# Patient Record
Sex: MALE | Race: WHITE | NOT HISPANIC OR LATINO | Employment: UNEMPLOYED | URBAN - METROPOLITAN AREA
[De-identification: names, ages, dates, MRNs, and addresses within clinical notes are randomized per-mention and may not be internally consistent; named-entity substitution may affect disease eponyms.]

---

## 2021-06-04 ENCOUNTER — HOSPITAL ENCOUNTER (EMERGENCY)
Facility: HOSPITAL | Age: 46
Discharge: HOME | End: 2021-06-04
Admitting: EMERGENCY MEDICINE

## 2021-06-04 VITALS
SYSTOLIC BLOOD PRESSURE: 126 MMHG | OXYGEN SATURATION: 99 % | BODY MASS INDEX: 25.5 KG/M2 | TEMPERATURE: 98.2 F | HEART RATE: 74 BPM | WEIGHT: 209.44 LBS | DIASTOLIC BLOOD PRESSURE: 96 MMHG | RESPIRATION RATE: 16 BRPM | HEIGHT: 76 IN

## 2021-06-04 DIAGNOSIS — M25.512 LEFT SHOULDER PAIN, UNSPECIFIED CHRONICITY: Primary | ICD-10-CM

## 2021-06-04 PROCEDURE — 25010000002 KETOROLAC TROMETHAMINE PER 15 MG: Performed by: NURSE PRACTITIONER

## 2021-06-04 PROCEDURE — 96372 THER/PROPH/DIAG INJ SC/IM: CPT

## 2021-06-04 PROCEDURE — 99283 EMERGENCY DEPT VISIT LOW MDM: CPT

## 2021-06-04 RX ORDER — NAPROXEN 500 MG/1
500 TABLET ORAL 2 TIMES DAILY WITH MEALS
Qty: 11 TABLET | Refills: 0 | Status: SHIPPED | OUTPATIENT
Start: 2021-06-04

## 2021-06-04 RX ORDER — HYDROCODONE BITARTRATE AND ACETAMINOPHEN 5; 325 MG/1; MG/1
1 TABLET ORAL ONCE AS NEEDED
Status: COMPLETED | OUTPATIENT
Start: 2021-06-04 | End: 2021-06-04

## 2021-06-04 RX ORDER — KETOROLAC TROMETHAMINE 30 MG/ML
30 INJECTION, SOLUTION INTRAMUSCULAR; INTRAVENOUS ONCE
Status: COMPLETED | OUTPATIENT
Start: 2021-06-04 | End: 2021-06-04

## 2021-06-04 RX ADMIN — HYDROCODONE BITARTRATE AND ACETAMINOPHEN 1 TABLET: 5; 325 TABLET ORAL at 16:12

## 2021-06-04 RX ADMIN — KETOROLAC TROMETHAMINE 30 MG: 30 INJECTION, SOLUTION INTRAMUSCULAR at 16:12

## 2021-06-04 NOTE — DISCHARGE INSTRUCTIONS
Take naproxen as prescribed.  Take with food to avoid GI upset and follow-up with Ortho, Dr. Rowland.  Call for an appointment.

## 2021-06-04 NOTE — ED PROVIDER NOTES
Subjective   History: Patient is a 45-year-old male complains of left shoulder pain.  States last Sunday he fell down the stairs dislocating his left shoulder.  Initially did not go to the ER states he went to sleep woke up was able to reduce his own shoulder.  States he eventually went to [place] [facility] had an x-ray showing no abnormality and was sent home.  States he was not given Ortho for follow-up.  Reports he is unable to go to work as he still has pain to the left shoulder although it has improved some.  No numbness tingling or weakness to the extremity.      Onset: 1 week  Location: Left shoulder  Duration: Waxes wanes  Character: Throb  Aggravating/Alleviating factors: Worse when pushing arm down  Radiation nonradiating  Severity: Mild/moderate            Review of Systems   Constitutional: Negative for chills, fatigue and fever.   HENT: Negative for congestion, sore throat, tinnitus and trouble swallowing.    Eyes: Negative for photophobia, discharge and visual disturbance.   Respiratory: Negative for cough and shortness of breath.    Cardiovascular: Negative for chest pain.   Gastrointestinal: Negative for abdominal pain, diarrhea, nausea and vomiting.   Genitourinary: Negative for dysuria, frequency and urgency.   Musculoskeletal: Positive for arthralgias. Negative for back pain and myalgias.   Skin: Negative for rash.   Neurological: Negative for dizziness and headaches.   Psychiatric/Behavioral: Negative for confusion.       No past medical history on file.    Allergies   Allergen Reactions   • Penicillins Rash       No past surgical history on file.    No family history on file.    Social History     Socioeconomic History   • Marital status:      Spouse name: Not on file   • Number of children: Not on file   • Years of education: Not on file   • Highest education level: Not on file           Objective   Physical Exam  Vitals reviewed.   Constitutional:       General: He is not in acute distress.      "Appearance: He is normal weight. He is not toxic-appearing.   HENT:      Head: Normocephalic.   Cardiovascular:      Rate and Rhythm: Normal rate.      Pulses: Normal pulses.      Heart sounds: Normal heart sounds. No murmur heard.     Pulmonary:      Effort: Pulmonary effort is normal.   Musculoskeletal:         General: Tenderness present. No swelling or deformity.      Cervical back: Normal range of motion.      Comments: No erythema edema warmth or surface trauma left shoulder. No deformity.  Tenderness of palpation anterior lateral and posterior shoulder. Pain with active ROM approx 45 degrees with extension and abduction left shoulder and some crepitus noted.  No pain with passive ROM. Negative empty can test. Less than 2 sec cap refill, 2+ radial pulse   Skin:     General: Skin is warm and dry.      Findings: No rash.   Neurological:      Mental Status: He is alert and oriented to person, place, and time.   Psychiatric:         Mood and Affect: Mood normal.         Behavior: Behavior normal.         Thought Content: Thought content normal.         Judgment: Judgment normal.         Procedures           ED Course      /94 (BP Location: Right arm, Patient Position: Lying)   Pulse 73   Temp 98.2 °F (36.8 °C)   Resp 16   Ht 193 cm (76\")   Wt 95 kg (209 lb 7 oz)   SpO2 100%   BMI 25.49 kg/m²   Labs Reviewed - No data to display  Medications   HYDROcodone-acetaminophen (NORCO) 5-325 MG per tablet 1 tablet (1 tablet Oral Given 6/4/21 1612)   ketorolac (TORADOL) injection 30 mg (30 mg Intramuscular Given 6/4/21 1612)     No radiology results for the last day                                       MDM     Given norco and toradol in ER.  No imaging as there is no new injury and patient reports there is some improvement in the pain but he is unable to work due to the pain. Recent xray [place] [facility] patient states showed no abnormality.  Will give ortho follow up    Rx naproxen    Final diagnoses:   Left shoulder " pain, unspecified chronicity       ED Disposition  ED Disposition     ED Disposition Condition Comment    Discharge Stable           Garo Rowland MD  [address] [place] [address] [phone]               Medication List      New Prescriptions    naproxen 500 MG EC tablet  Commonly known as: EC NAPROSYN  Take 1 tablet by mouth 2 (Two) Times a Day With Meals.           Where to Get Your Medications      These medications were sent to [facility] [address] [place] [address] - [address] [facility] - [phone] [place] [phone]   [address] [facility] [place] [address]    Phone: [phone]   · naproxen 500 MG EC tablet          Sosa Elliott, APRN  06/04/21 7352

## 2021-06-04 NOTE — ED NOTES
Pt reports he dislocated his L shoulder and tried to pop it back in place himself. The next day his arm felt bad so he went to [place] where he was told he tore his muscle and could go back to work today. Pt reports he still cannot lift his arm even though he has been wearing a sling. Pt reports L shoulder pain is 8/10 and paralyzing.      Emily King, RN  06/04/21 8408

## 2022-08-26 ENCOUNTER — APPOINTMENT (OUTPATIENT)
Dept: GENERAL RADIOLOGY | Facility: HOSPITAL | Age: 47
End: 2022-08-26

## 2022-08-26 ENCOUNTER — HOSPITAL ENCOUNTER (EMERGENCY)
Facility: HOSPITAL | Age: 47
Discharge: HOME | End: 2022-08-26
Attending: EMERGENCY MEDICINE | Admitting: EMERGENCY MEDICINE

## 2022-08-26 VITALS
DIASTOLIC BLOOD PRESSURE: 73 MMHG | HEART RATE: 82 BPM | SYSTOLIC BLOOD PRESSURE: 124 MMHG | HEIGHT: 76 IN | OXYGEN SATURATION: 100 % | TEMPERATURE: 99 F | BODY MASS INDEX: 22.12 KG/M2 | WEIGHT: 181.66 LBS | RESPIRATION RATE: 19 BRPM

## 2022-08-26 DIAGNOSIS — R53.83 MALAISE AND FATIGUE: ICD-10-CM

## 2022-08-26 DIAGNOSIS — R53.81 MALAISE AND FATIGUE: ICD-10-CM

## 2022-08-26 DIAGNOSIS — U07.1 COVID-19: Primary | ICD-10-CM

## 2022-08-26 LAB
ALBUMIN SERPL-MCNC: 4 G/DL (ref 3.5–5.2)
ALBUMIN/GLOB SERPL: 1.4 G/DL
ALP SERPL-CCNC: 71 U/L (ref 39–117)
ALT SERPL W P-5'-P-CCNC: 16 U/L (ref 1–41)
AMPHET+METHAMPHET UR QL: POSITIVE
ANION GAP SERPL CALCULATED.3IONS-SCNC: 13 MMOL/L (ref 5–15)
APAP SERPL-MCNC: <5 MCG/ML (ref 0–30)
AST SERPL-CCNC: 22 U/L (ref 1–40)
BARBITURATES UR QL SCN: NEGATIVE
BENZODIAZ UR QL SCN: NEGATIVE
BILIRUB SERPL-MCNC: <0.2 MG/DL (ref 0–1.2)
BILIRUB UR QL STRIP: NEGATIVE
BUN SERPL-MCNC: 7 MG/DL (ref 6–20)
BUN/CREAT SERPL: 7.6 (ref 7–25)
CALCIUM SPEC-SCNC: 9.1 MG/DL (ref 8.6–10.5)
CANNABINOIDS SERPL QL: POSITIVE
CHLORIDE SERPL-SCNC: 96 MMOL/L (ref 98–107)
CLARITY UR: CLEAR
CO2 SERPL-SCNC: 23 MMOL/L (ref 22–29)
COCAINE UR QL: NEGATIVE
COLOR UR: YELLOW
CREAT SERPL-MCNC: 0.92 MG/DL (ref 0.76–1.27)
DEPRECATED RDW RBC AUTO: 39.8 FL (ref 37–54)
EGFRCR SERPLBLD CKD-EPI 2021: 102.6 ML/MIN/1.73
ERYTHROCYTE [DISTWIDTH] IN BLOOD BY AUTOMATED COUNT: 13.8 % (ref 12.3–15.4)
ETHANOL UR QL: <0.01 %
FLUAV SUBTYP SPEC NAA+PROBE: NOT DETECTED
FLUBV RNA ISLT QL NAA+PROBE: NOT DETECTED
GLOBULIN UR ELPH-MCNC: 2.8 GM/DL
GLUCOSE SERPL-MCNC: 131 MG/DL (ref 65–99)
GLUCOSE UR STRIP-MCNC: NEGATIVE MG/DL
HCT VFR BLD AUTO: 39.2 % (ref 37.5–51)
HETEROPH AB SER QL LA: NEGATIVE
HGB BLD-MCNC: 13 G/DL (ref 13–17.7)
HGB UR QL STRIP.AUTO: NEGATIVE
KETONES UR QL STRIP: NEGATIVE
LEUKOCYTE ESTERASE UR QL STRIP.AUTO: NEGATIVE
LYMPHOCYTES # BLD MANUAL: 1.05 10*3/MM3 (ref 0.7–3.1)
LYMPHOCYTES NFR BLD MANUAL: 8 % (ref 5–12)
MCH RBC QN AUTO: 28 PG (ref 26.6–33)
MCHC RBC AUTO-ENTMCNC: 33.1 G/DL (ref 31.5–35.7)
MCV RBC AUTO: 84.5 FL (ref 79–97)
METHADONE UR QL SCN: NEGATIVE
MONOCYTES # BLD: 0.44 10*3/MM3 (ref 0.1–0.9)
NEUTROPHILS # BLD AUTO: 4.02 10*3/MM3 (ref 1.7–7)
NEUTROPHILS NFR BLD MANUAL: 73 % (ref 42.7–76)
NITRITE UR QL STRIP: NEGATIVE
OPIATES UR QL: NEGATIVE
OXYCODONE UR QL SCN: NEGATIVE
PH UR STRIP.AUTO: 6.5 [PH] (ref 5–8)
PLAT MORPH BLD: NORMAL
PLATELET # BLD AUTO: 254 10*3/MM3 (ref 140–450)
PMV BLD AUTO: 7.3 FL (ref 6–12)
POTASSIUM SERPL-SCNC: 3.5 MMOL/L (ref 3.5–5.2)
PROT SERPL-MCNC: 6.8 G/DL (ref 6–8.5)
PROT UR QL STRIP: ABNORMAL
RBC # BLD AUTO: 4.63 10*6/MM3 (ref 4.14–5.8)
RBC MORPH BLD: NORMAL
S PYO AG THROAT QL: NEGATIVE
SARS-COV-2 RNA PNL SPEC NAA+PROBE: DETECTED
SCAN SLIDE: NORMAL
SODIUM SERPL-SCNC: 132 MMOL/L (ref 136–145)
SP GR UR STRIP: 1.01 (ref 1–1.03)
UROBILINOGEN UR QL STRIP: ABNORMAL
VARIANT LYMPHS NFR BLD MANUAL: 19 % (ref 19.6–45.3)
WBC MORPH BLD: NORMAL
WBC NRBC COR # BLD: 5.5 10*3/MM3 (ref 3.4–10.8)

## 2022-08-26 PROCEDURE — C9803 HOPD COVID-19 SPEC COLLECT: HCPCS

## 2022-08-26 PROCEDURE — 71045 X-RAY EXAM CHEST 1 VIEW: CPT

## 2022-08-26 PROCEDURE — 80307 DRUG TEST PRSMV CHEM ANLYZR: CPT

## 2022-08-26 PROCEDURE — 82077 ASSAY SPEC XCP UR&BREATH IA: CPT

## 2022-08-26 PROCEDURE — 86308 HETEROPHILE ANTIBODY SCREEN: CPT

## 2022-08-26 PROCEDURE — 96374 THER/PROPH/DIAG INJ IV PUSH: CPT

## 2022-08-26 PROCEDURE — 87502 INFLUENZA DNA AMP PROBE: CPT

## 2022-08-26 PROCEDURE — 81003 URINALYSIS AUTO W/O SCOPE: CPT

## 2022-08-26 PROCEDURE — 87635 SARS-COV-2 COVID-19 AMP PRB: CPT

## 2022-08-26 PROCEDURE — 85007 BL SMEAR W/DIFF WBC COUNT: CPT

## 2022-08-26 PROCEDURE — 80143 DRUG ASSAY ACETAMINOPHEN: CPT

## 2022-08-26 PROCEDURE — 87651 STREP A DNA AMP PROBE: CPT

## 2022-08-26 PROCEDURE — 85025 COMPLETE CBC W/AUTO DIFF WBC: CPT

## 2022-08-26 PROCEDURE — 80053 COMPREHEN METABOLIC PANEL: CPT

## 2022-08-26 PROCEDURE — 99284 EMERGENCY DEPT VISIT MOD MDM: CPT

## 2022-08-26 PROCEDURE — 25010000002 ONDANSETRON PER 1 MG

## 2022-08-26 RX ORDER — SODIUM CHLORIDE 0.9 % (FLUSH) 0.9 %
10 SYRINGE (ML) INJECTION AS NEEDED
Status: DISCONTINUED | OUTPATIENT
Start: 2022-08-26 | End: 2022-08-26 | Stop reason: HOSPADM

## 2022-08-26 RX ORDER — IBUPROFEN 400 MG/1
800 TABLET ORAL ONCE
Status: COMPLETED | OUTPATIENT
Start: 2022-08-26 | End: 2022-08-26

## 2022-08-26 RX ORDER — ONDANSETRON 4 MG/1
4 TABLET, ORALLY DISINTEGRATING ORAL EVERY 8 HOURS PRN
Qty: 12 TABLET | Refills: 0 | Status: SHIPPED | OUTPATIENT
Start: 2022-08-26 | End: 2022-08-30

## 2022-08-26 RX ORDER — ONDANSETRON 2 MG/ML
4 INJECTION INTRAMUSCULAR; INTRAVENOUS ONCE
Status: COMPLETED | OUTPATIENT
Start: 2022-08-26 | End: 2022-08-26

## 2022-08-26 RX ADMIN — IBUPROFEN 800 MG: 400 TABLET, FILM COATED ORAL at 17:31

## 2022-08-26 RX ADMIN — SODIUM CHLORIDE 1000 ML: 9 INJECTION, SOLUTION INTRAVENOUS at 17:32

## 2022-08-26 RX ADMIN — ONDANSETRON 4 MG: 2 INJECTION INTRAMUSCULAR; INTRAVENOUS at 17:31

## 2022-08-26 RX ADMIN — SODIUM CHLORIDE 1000 ML: 9 INJECTION, SOLUTION INTRAVENOUS at 19:28

## 2023-01-04 ENCOUNTER — CLINICAL SUPPORT (OUTPATIENT)
Dept: FAMILY MEDICINE CLINIC | Facility: CLINIC | Age: 48
End: 2023-01-04

## 2023-01-04 DIAGNOSIS — Z02.1 PRE-EMPLOYMENT DRUG SCREENING: Primary | ICD-10-CM

## 2023-01-04 PROCEDURE — 80305 DRUG TEST PRSMV DIR OPT OBS: CPT | Performed by: FAMILY MEDICINE

## 2024-12-11 ENCOUNTER — CLINICAL SUPPORT (OUTPATIENT)
Dept: FAMILY MEDICINE CLINIC | Facility: CLINIC | Age: 49
End: 2024-12-11

## 2024-12-11 DIAGNOSIS — Z02.1 PRE-EMPLOYMENT DRUG SCREENING: Primary | ICD-10-CM

## 2024-12-11 PROCEDURE — 80305 DRUG TEST PRSMV DIR OPT OBS: CPT | Performed by: FAMILY MEDICINE
